# Patient Record
Sex: FEMALE | Race: WHITE
[De-identification: names, ages, dates, MRNs, and addresses within clinical notes are randomized per-mention and may not be internally consistent; named-entity substitution may affect disease eponyms.]

---
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
## 2021-03-08 ENCOUNTER — HOSPITAL ENCOUNTER (OUTPATIENT)
Dept: HOSPITAL 92 - BICULT | Age: 21
Discharge: HOME | End: 2021-03-08
Payer: COMMERCIAL

## 2021-03-08 DIAGNOSIS — N92.1: Primary | ICD-10-CM

## 2021-03-08 DIAGNOSIS — R93.89: ICD-10-CM

## 2021-03-08 PROCEDURE — 76856 US EXAM PELVIC COMPLETE: CPT

## 2021-06-14 ENCOUNTER — HOSPITAL ENCOUNTER (OUTPATIENT)
Dept: HOSPITAL 92 - BICULT | Age: 21
Discharge: HOME | End: 2021-06-14
Payer: COMMERCIAL

## 2021-06-14 DIAGNOSIS — R94.5: Primary | ICD-10-CM

## 2021-06-14 DIAGNOSIS — K76.0: ICD-10-CM

## 2021-06-14 DIAGNOSIS — R10.84: ICD-10-CM

## 2021-06-14 PROCEDURE — 76705 ECHO EXAM OF ABDOMEN: CPT

## 2022-10-27 ENCOUNTER — HOSPITAL ENCOUNTER (EMERGENCY)
Dept: HOSPITAL 57 - BURERS | Age: 22
LOS: 1 days | Discharge: HOME | End: 2022-10-28
Payer: COMMERCIAL

## 2022-10-27 DIAGNOSIS — S29.012A: Primary | ICD-10-CM

## 2022-10-27 DIAGNOSIS — S13.4XXA: ICD-10-CM
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
## 2022-10-27 DIAGNOSIS — F17.210: ICD-10-CM

## 2022-10-27 DIAGNOSIS — S23.3XXA: ICD-10-CM

## 2022-10-27 PROCEDURE — 99283 EMERGENCY DEPT VISIT LOW MDM: CPT

## 2022-10-28 ENCOUNTER — HOSPITAL ENCOUNTER (EMERGENCY)
Dept: HOSPITAL 57 - BURERS | Age: 22
Discharge: HOME | End: 2022-10-28
Payer: COMMERCIAL

## 2022-10-28 DIAGNOSIS — S39.011A: Primary | ICD-10-CM
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
## 2022-10-28 DIAGNOSIS — F17.210: ICD-10-CM

## 2022-10-28 PROCEDURE — 99283 EMERGENCY DEPT VISIT LOW MDM: CPT
